# Patient Record
Sex: FEMALE | Race: WHITE | URBAN - METROPOLITAN AREA
[De-identification: names, ages, dates, MRNs, and addresses within clinical notes are randomized per-mention and may not be internally consistent; named-entity substitution may affect disease eponyms.]

---

## 2023-09-19 ENCOUNTER — OFFICE VISIT (OUTPATIENT)
Age: 38
End: 2023-09-19

## 2023-09-19 VITALS
TEMPERATURE: 98.2 F | HEIGHT: 66 IN | BODY MASS INDEX: 32.37 KG/M2 | SYSTOLIC BLOOD PRESSURE: 114 MMHG | DIASTOLIC BLOOD PRESSURE: 75 MMHG | WEIGHT: 201.4 LBS | HEART RATE: 78 BPM | OXYGEN SATURATION: 96 %

## 2023-09-19 DIAGNOSIS — J01.00 ACUTE MAXILLARY SINUSITIS, RECURRENCE NOT SPECIFIED: Primary | ICD-10-CM

## 2023-09-19 LAB
Lab: NORMAL
QC PASS/FAIL: NORMAL
SARS-COV-2 RDRP RESP QL NAA+PROBE: NEGATIVE

## 2023-09-19 RX ORDER — PREDNISONE 20 MG/1
20 TABLET ORAL 2 TIMES DAILY
Qty: 10 TABLET | Refills: 0 | Status: SHIPPED | OUTPATIENT
Start: 2023-09-19 | End: 2023-09-24

## 2023-09-19 RX ORDER — AZITHROMYCIN 250 MG/1
250 TABLET, FILM COATED ORAL SEE ADMIN INSTRUCTIONS
Qty: 6 TABLET | Refills: 0 | Status: SHIPPED | OUTPATIENT
Start: 2023-09-19 | End: 2023-09-24

## 2023-09-19 ASSESSMENT — ENCOUNTER SYMPTOMS
VOMITING: 0
WHEEZING: 0
NAUSEA: 0
SORE THROAT: 0
EYE REDNESS: 0
SHORTNESS OF BREATH: 0
COUGH: 1
RHINORRHEA: 0
SINUS PRESSURE: 1
DIARRHEA: 1
ABDOMINAL PAIN: 0

## 2024-02-29 ENCOUNTER — OFFICE VISIT (OUTPATIENT)
Age: 39
End: 2024-02-29

## 2024-02-29 VITALS
WEIGHT: 206 LBS | OXYGEN SATURATION: 98 % | DIASTOLIC BLOOD PRESSURE: 88 MMHG | BODY MASS INDEX: 33.25 KG/M2 | TEMPERATURE: 97.8 F | SYSTOLIC BLOOD PRESSURE: 129 MMHG | HEART RATE: 80 BPM

## 2024-02-29 DIAGNOSIS — J03.00 STREP TONSILLITIS: Primary | ICD-10-CM

## 2024-02-29 DIAGNOSIS — I10 HYPERTENSION, UNSPECIFIED TYPE: ICD-10-CM

## 2024-02-29 DIAGNOSIS — J01.40 ACUTE NON-RECURRENT PANSINUSITIS: ICD-10-CM

## 2024-02-29 RX ORDER — BROMPHENIRAMINE MALEATE, PSEUDOEPHEDRINE HYDROCHLORIDE, AND DEXTROMETHORPHAN HYDROBROMIDE 2; 30; 10 MG/5ML; MG/5ML; MG/5ML
5 SYRUP ORAL 4 TIMES DAILY PRN
Qty: 118 ML | Refills: 0 | Status: SHIPPED | OUTPATIENT
Start: 2024-02-29 | End: 2024-02-29 | Stop reason: RX

## 2024-02-29 RX ORDER — GUAIFENESIN 600 MG/1
600 TABLET, EXTENDED RELEASE ORAL 2 TIMES DAILY
Qty: 30 TABLET | Refills: 0 | Status: SHIPPED | OUTPATIENT
Start: 2024-02-29 | End: 2024-03-15

## 2024-02-29 RX ORDER — BROMPHENIRAMINE MALEATE, PSEUDOEPHEDRINE HYDROCHLORIDE, AND DEXTROMETHORPHAN HYDROBROMIDE 2; 30; 10 MG/5ML; MG/5ML; MG/5ML
5 SYRUP ORAL 4 TIMES DAILY PRN
Qty: 118 ML | Refills: 0 | Status: SHIPPED | OUTPATIENT
Start: 2024-02-29

## 2024-02-29 RX ORDER — AMOXICILLIN AND CLAVULANATE POTASSIUM 875; 125 MG/1; MG/1
1 TABLET, FILM COATED ORAL 2 TIMES DAILY
Qty: 20 TABLET | Refills: 0 | Status: SHIPPED | OUTPATIENT
Start: 2024-02-29 | End: 2024-02-29 | Stop reason: RX

## 2024-02-29 RX ORDER — AMOXICILLIN AND CLAVULANATE POTASSIUM 875; 125 MG/1; MG/1
1 TABLET, FILM COATED ORAL 2 TIMES DAILY
Qty: 20 TABLET | Refills: 0 | Status: SHIPPED | OUTPATIENT
Start: 2024-02-29 | End: 2024-03-10

## 2024-02-29 RX ORDER — GUAIFENESIN 600 MG/1
600 TABLET, EXTENDED RELEASE ORAL 2 TIMES DAILY
Qty: 30 TABLET | Refills: 0 | Status: SHIPPED | OUTPATIENT
Start: 2024-02-29 | End: 2024-02-29 | Stop reason: RX

## 2024-02-29 NOTE — PROGRESS NOTES
Joleen Mooney (:  1985) is a 39 y.o. female,Established patient, here for evaluation of the following chief complaint(s):  Sinusitis (Headaches, sinus pressure, cough w/ green mucous, sore throat x 4 days)      ASSESSMENT/PLAN:  Visit Diagnoses and Associated Orders       Strep tonsillitis    -  Primary    amoxicillin-clavulanate (AUGMENTIN) 875-125 MG per tablet [09995]           Acute non-recurrent pansinusitis        amoxicillin-clavulanate (AUGMENTIN) 875-125 MG per tablet [55164]      brompheniramine-pseudoephedrine-DM 2-30-10 MG/5ML syrup [[id number]]      guaiFENesin (MUCINEX) 600 MG extended release tablet [[id number]]           Hypertension, unspecified type        Referral for No Primary Care Physician - Routine [PXP005 Custom]                 Increase fluids (preferably with electrolytes) and rest.  Emergency follow up required for symptoms including, but not limited to, shortness of breath, chest pain, mental status change, fevers >101, difficulty or inability to swallow, dehydration, or if symptoms worsen.  See printed instructions given at discharge.     Complete the full course of antibiotics . Eat a yohut daily while on antibiotics to prevent yeast infection. Use a second form of birth control while on antibiotics, as antibiotics often interfere with birth control effectiveness.     Blood pressure elevated at today's visit. Encouraged patient to follow up with a PCP for re-evaluation of elevated BP.   Educated on 'red flags' on when to go to the ED in regards to blood pressure (headache, chest pain, shortness of breath, swelling of body parts, ringing of the ears, and dizziness).     SUBJECTIVE/OBJECTIVE:  C/o frontal/maxillary sinus pain/Headaches, productive cough w/ green mucous, sore throat x 4 days  Throat and tonsils are red and swollen w/ c/o pain swallowing saliva. Bilateral TM are bulging.        History provided by:  Patient    HPI:   39 y.o. female presents with symptoms of Sinus

## 2024-10-29 ENCOUNTER — OFFICE VISIT (OUTPATIENT)
Age: 39
End: 2024-10-29

## 2024-10-29 VITALS
RESPIRATION RATE: 16 BRPM | HEART RATE: 70 BPM | BODY MASS INDEX: 34.7 KG/M2 | DIASTOLIC BLOOD PRESSURE: 84 MMHG | TEMPERATURE: 98.6 F | WEIGHT: 215 LBS | SYSTOLIC BLOOD PRESSURE: 136 MMHG | OXYGEN SATURATION: 98 %

## 2024-10-29 DIAGNOSIS — R05.1 ACUTE COUGH: Primary | ICD-10-CM

## 2024-10-29 DIAGNOSIS — J01.90 ACUTE SINUSITIS, RECURRENCE NOT SPECIFIED, UNSPECIFIED LOCATION: ICD-10-CM

## 2024-10-29 LAB
Lab: NORMAL
PERFORMING INSTRUMENT: NORMAL
QC PASS/FAIL: NORMAL
SARS-COV-2, POC: NORMAL

## 2024-10-29 RX ORDER — PREDNISONE 20 MG/1
40 TABLET ORAL DAILY
Qty: 10 TABLET | Refills: 0 | Status: SHIPPED | OUTPATIENT
Start: 2024-10-29 | End: 2024-11-03

## 2024-10-29 ASSESSMENT — ENCOUNTER SYMPTOMS
SHORTNESS OF BREATH: 0
RHINORRHEA: 1
SORE THROAT: 1
COUGH: 1
ABDOMINAL PAIN: 0
VOMITING: 0
DIARRHEA: 0
WHEEZING: 0
NAUSEA: 0

## 2024-10-29 NOTE — PROGRESS NOTES
Joleen Mooney (:  1985) is a 39 y.o. female,Established patient, here for evaluation of the following chief complaint(s):  Pharyngitis (Sore throat slight cough sinus pressure headache body aches congestion runny nose ear pain)      ASSESSMENT/PLAN:  1. Acute cough    - POCT COVID-19, Antigen    2. Acute sinusitis, recurrence not specified, unspecified location    - predniSONE (DELTASONE) 20 MG tablet; Take 2 tablets by mouth daily for 5 days  Dispense: 10 tablet; Refill: 0  - amoxicillin-clavulanate (AUGMENTIN) 875-125 MG per tablet; Take 1 tablet by mouth 2 times daily for 7 days  Dispense: 14 tablet; Refill: 0     -increase fluid intake,take Tylenol as needed.  Return if symptoms worsen or fail to improve.    SUBJECTIVE/OBJECTIVE:    History provided by:  Patient  Pharyngitis  Severity:  Moderate  Onset quality:  Sudden  Duration:  2 days  Timing:  Constant  Progression:  Unchanged  Chronicity:  New  Associated symptoms: congestion, cough, fatigue, headaches, myalgias, rhinorrhea and sore throat    Associated symptoms: no abdominal pain, no chest pain, no diarrhea, no ear pain, no fever, no nausea, no rash, no shortness of breath, no vomiting and no wheezing        Vitals:    10/29/24 1913 10/29/24 1920   BP: 136/84 136/84   Pulse: 70    Resp: 16    Temp: 98.6 °F (37 °C)    TempSrc: Oral    SpO2: 98%    Weight: 97.5 kg (215 lb)        Review of Systems   Constitutional:  Positive for fatigue. Negative for fever.   HENT:  Positive for congestion, rhinorrhea and sore throat. Negative for ear pain.    Respiratory:  Positive for cough. Negative for shortness of breath and wheezing.    Cardiovascular:  Negative for chest pain.   Gastrointestinal:  Negative for abdominal pain, diarrhea, nausea and vomiting.   Musculoskeletal:  Positive for myalgias.   Skin:  Negative for rash.   Neurological:  Positive for headaches.       Physical Exam  Constitutional:       General: She is not in acute distress.  HENT:

## 2024-11-07 ENCOUNTER — OFFICE VISIT (OUTPATIENT)
Age: 39
End: 2024-11-07

## 2024-11-07 VITALS
HEART RATE: 90 BPM | TEMPERATURE: 98.3 F | DIASTOLIC BLOOD PRESSURE: 76 MMHG | BODY MASS INDEX: 34.73 KG/M2 | SYSTOLIC BLOOD PRESSURE: 114 MMHG | OXYGEN SATURATION: 96 % | WEIGHT: 215.2 LBS

## 2024-11-07 DIAGNOSIS — J02.9 SORE THROAT: ICD-10-CM

## 2024-11-07 DIAGNOSIS — J03.90 TONSILLITIS: Primary | ICD-10-CM

## 2024-11-07 DIAGNOSIS — R52 GENERALIZED BODY ACHES: ICD-10-CM

## 2024-11-07 DIAGNOSIS — J11.1 INFLUENZA: ICD-10-CM

## 2024-11-07 LAB
INFLUENZA A ANTIGEN, POC: NEGATIVE
INFLUENZA B ANTIGEN, POC: NEGATIVE
Lab: NORMAL
PERFORMING INSTRUMENT: NORMAL
QC PASS/FAIL: NORMAL
S PYO AG THROAT QL: NORMAL
SARS-COV-2, POC: NORMAL

## 2024-11-07 RX ORDER — CEFDINIR 300 MG/1
300 CAPSULE ORAL 2 TIMES DAILY
Qty: 20 CAPSULE | Refills: 0 | Status: SHIPPED | OUTPATIENT
Start: 2024-11-07 | End: 2024-11-17

## 2024-11-07 RX ORDER — BROMPHENIRAMINE MALEATE, PSEUDOEPHEDRINE HYDROCHLORIDE, AND DEXTROMETHORPHAN HYDROBROMIDE 2; 30; 10 MG/5ML; MG/5ML; MG/5ML
5 SYRUP ORAL 4 TIMES DAILY PRN
Qty: 118 ML | Refills: 0 | Status: SHIPPED | OUTPATIENT
Start: 2024-11-07

## 2024-11-07 RX ORDER — GUAIFENESIN 600 MG/1
600 TABLET, EXTENDED RELEASE ORAL 2 TIMES DAILY
Qty: 30 TABLET | Refills: 0 | Status: SHIPPED | OUTPATIENT
Start: 2024-11-07 | End: 2024-11-22

## 2024-11-07 RX ORDER — OSELTAMIVIR PHOSPHATE 75 MG/1
75 CAPSULE ORAL 2 TIMES DAILY
Qty: 10 CAPSULE | Refills: 0 | Status: SHIPPED | OUTPATIENT
Start: 2024-11-07 | End: 2024-11-12

## 2024-11-07 RX ORDER — FLUCONAZOLE 150 MG/1
150 TABLET ORAL
Qty: 2 TABLET | Refills: 0 | Status: SHIPPED | OUTPATIENT
Start: 2024-11-07 | End: 2024-11-13

## 2024-11-07 NOTE — PROGRESS NOTES
Joleen Mooney (:  1985) is a 39 y.o. female,Established patient, here for evaluation of the following chief complaint(s):  Cough (Patient complains of a cough (green mucus) and sore throat x 2 days.  Patient states she was seen on 10/29/24 and was prescribed Augmentin and prednisone, has completed medication but now has a yeast infection from the Augmentin.  )      Assessment & Plan :  Visit Diagnoses and Associated Orders       Tonsillitis    -  Primary    cefdinir (OMNICEF) 300 MG capsule [[id number]]           Sore throat        POCT rapid strep A [92153 Custom]      POCT COVID-19, Antigen [BMP791 Custom]      POCT Influenza A/B Antigen [26323 Custom]           Generalized body aches             Influenza        oseltamivir (TAMIFLU) 75 MG capsule [31079]      brompheniramine-pseudoephedrine-DM 2-30-10 MG/5ML syrup [[id number]]      guaiFENesin (MUCINEX) 600 MG extended release tablet [[id number]]           ORDERS WITHOUT AN ASSOCIATED DIAGNOSIS    fluconazole (DIFLUCAN) 150 MG tablet [[id number]]             Increase fluids (preferably with electrolytes) and rest.  Emergency follow up required for symptoms including, but not limited to, shortness of breath, chest pain, mental status change, fevers >101, difficulty or inability to swallow, dehydration, or if symptoms worsen.  See printed instructions given at discharge.     Subjective :  Cough (Patient complains of a cough (green mucus) and sore throat x 2 days.  Patient states she was seen on 10/29/24 and was prescribed Augmentin and prednisone, has completed medication but now has a yeast infection from the Augmentin.  )  Throat and tonsils are red and swollen and pt c/o difficulty swallowing saliva. Chest/nasal congestion noted. Also w/ c/o facial pressure and pain.      History provided by:  Patient    HPI:   39 y.o. female presents with symptoms of Influenza, sinusitis and tonsillitis.         Vitals:    24 1557   BP: 114/76   Site: Right Upper Arm   Position:

## 2024-11-07 NOTE — PATIENT INSTRUCTIONS
Discharge medications reviewed with the patient and appropriate educational materials and side effects teaching were provided.    Increase fluids (preferably with electrolytes) and rest.  Emergency follow up required for symptoms including, but not limited to, shortness of breath, chest pain, mental status change, fevers >101, difficulty or inability to swallow, dehydration, or if symptoms worsen.  See printed instructions given at discharge.     Complete the full course of antibiotics . Eat a yogurt daily while on antibiotics to prevent yeast infection. Use a second form of birth control while on antibiotics, as antibiotics often interfere with birth control effectiveness.

## 2025-01-21 ENCOUNTER — OFFICE VISIT (OUTPATIENT)
Age: 40
End: 2025-01-21

## 2025-01-21 VITALS
HEART RATE: 98 BPM | OXYGEN SATURATION: 98 % | BODY MASS INDEX: 34.87 KG/M2 | WEIGHT: 217 LBS | DIASTOLIC BLOOD PRESSURE: 88 MMHG | TEMPERATURE: 98.5 F | HEIGHT: 66 IN | SYSTOLIC BLOOD PRESSURE: 130 MMHG

## 2025-01-21 DIAGNOSIS — R03.0 ELEVATED BP WITHOUT DIAGNOSIS OF HYPERTENSION: ICD-10-CM

## 2025-01-21 DIAGNOSIS — L01.00 IMPETIGO: Primary | ICD-10-CM

## 2025-01-21 RX ORDER — MUPIROCIN 20 MG/G
OINTMENT TOPICAL
Qty: 30 G | Refills: 0 | Status: SHIPPED | OUTPATIENT
Start: 2025-01-21

## 2025-01-21 RX ORDER — CEPHALEXIN 500 MG/1
500 CAPSULE ORAL 4 TIMES DAILY
Qty: 40 CAPSULE | Refills: 0 | Status: SHIPPED | OUTPATIENT
Start: 2025-01-21 | End: 2025-01-31

## 2025-01-21 ASSESSMENT — ENCOUNTER SYMPTOMS
NAUSEA: 0
SINUS PRESSURE: 0
VOMITING: 0
SORE THROAT: 0

## 2025-01-21 NOTE — PROGRESS NOTES
Joleen Mooney (:  1985) is a 39 y.o. female,Established patient, here for evaluation of the following chief complaint(s):  Abrasion (X 4 days)      ASSESSMENT/PLAN:  1. Impetigo    - cephALEXin (KEFLEX) 500 MG capsule; Take 1 capsule by mouth 4 times daily for 10 days  Dispense: 40 capsule; Refill: 0  - mupirocin (BACTROBAN) 2 % ointment; Apply topically 3 times daily.  Dispense: 30 g; Refill: 0    -instruction in AVS  2. Elevated BP without diagnosis of hypertension    - Non BS - External Referral To Primary Care     -pt was advised to f/u with her PCP  Return if symptoms worsen or fail to improve.    SUBJECTIVE/OBJECTIVE:  Pt c/o 4 day h/o facial rash          Vitals:    25 1849 25 1859   BP: 130/88 130/88   Site: Right Lower Arm    Position: Sitting    Cuff Size: Small Adult    Pulse: 98    Temp: 98.5 °F (36.9 °C)    TempSrc: Temporal    SpO2: 98%    Weight: 98.4 kg (217 lb)    Height: 1.676 m (5' 6\")        Review of Systems   Constitutional:  Negative for activity change, appetite change, fatigue and fever.   HENT:  Negative for congestion, sinus pressure and sore throat.    Gastrointestinal:  Negative for nausea and vomiting.   Skin:  Positive for rash. Negative for wound.   Neurological:  Negative for dizziness and headaches.       Physical Exam  Constitutional:       General: She is not in acute distress.  HENT:      Nose: No congestion.      Mouth/Throat:      Mouth: Mucous membranes are moist.      Pharynx: No oropharyngeal exudate or posterior oropharyngeal erythema.   Eyes:      Conjunctiva/sclera: Conjunctivae normal.      Pupils: Pupils are equal, round, and reactive to light.   Cardiovascular:      Rate and Rhythm: Normal rate and regular rhythm.   Pulmonary:      Effort: Pulmonary effort is normal. No respiratory distress.      Breath sounds: Normal breath sounds.   Musculoskeletal:      Cervical back: Neck supple. No rigidity.   Lymphadenopathy:      Cervical: No cervical